# Patient Record
Sex: FEMALE | Race: WHITE | URBAN - METROPOLITAN AREA
[De-identification: names, ages, dates, MRNs, and addresses within clinical notes are randomized per-mention and may not be internally consistent; named-entity substitution may affect disease eponyms.]

---

## 2022-11-18 ENCOUNTER — APPOINTMENT (RX ONLY)
Dept: URBAN - METROPOLITAN AREA CLINIC 51 | Facility: CLINIC | Age: 62
Setting detail: DERMATOLOGY
End: 2022-11-18

## 2022-11-18 ENCOUNTER — APPOINTMENT (RX ONLY)
Dept: URBAN - METROPOLITAN AREA CLINIC 379 | Facility: CLINIC | Age: 62
Setting detail: DERMATOLOGY
End: 2022-11-18

## 2022-11-18 DIAGNOSIS — Z41.9 ENCOUNTER FOR PROCEDURE FOR PURPOSES OTHER THAN REMEDYING HEALTH STATE, UNSPECIFIED: ICD-10-CM

## 2022-11-18 PROCEDURE — ? CONSULTATION - BLEPHAROPLASTY

## 2023-01-24 ENCOUNTER — APPOINTMENT (RX ONLY)
Dept: URBAN - METROPOLITAN AREA CLINIC 51 | Facility: CLINIC | Age: 63
Setting detail: DERMATOLOGY
End: 2023-01-24

## 2023-01-24 DIAGNOSIS — Z41.9 ENCOUNTER FOR PROCEDURE FOR PURPOSES OTHER THAN REMEDYING HEALTH STATE, UNSPECIFIED: ICD-10-CM

## 2023-01-24 PROCEDURE — ? PRESCRIPTION

## 2023-01-24 PROCEDURE — ? PRE-OP WORKLIST

## 2023-01-24 RX ORDER — CEPHALEXIN 500 MG/1
TABLET ORAL
Qty: 10 | Refills: 0 | Status: ERX | COMMUNITY
Start: 2023-01-24

## 2023-01-24 RX ORDER — ONDANSETRON 4 MG/1
TABLET, ORALLY DISINTEGRATING ORAL
Qty: 4 | Refills: 0 | Status: ERX | COMMUNITY
Start: 2023-01-24

## 2023-01-24 RX ORDER — MELOXICAM 7.5 MG/1
TABLET ORAL
Qty: 30 | Refills: 0 | Status: CANCELLED
Stop reason: SDUPTHER

## 2023-01-24 RX ORDER — DIAZEPAM 5 MG/1
TABLET ORAL
Qty: 3 | Refills: 0 | Status: ERX | COMMUNITY
Start: 2023-01-24

## 2023-01-24 RX ORDER — GABAPENTIN 100 MG/1
CAPSULE ORAL
Qty: 60 | Refills: 0 | Status: CANCELLED
Stop reason: SDUPTHER

## 2023-01-24 RX ADMIN — ONDANSETRON: 4 TABLET, ORALLY DISINTEGRATING ORAL at 00:00

## 2023-01-24 RX ADMIN — DIAZEPAM: 5 TABLET ORAL at 00:00

## 2023-01-24 RX ADMIN — CEPHALEXIN: 500 TABLET ORAL at 00:00

## 2023-01-24 NOTE — PROCEDURE: PRE-OP WORKLIST
Date Of Surgery: 1/27/23
Photos Taken?: yes
Date Of Evaluation: 1/24/23
Detail Level: Detailed
Surgeon: Dr Kay
Coordination With:: Peyton

## 2023-01-27 ENCOUNTER — APPOINTMENT (RX ONLY)
Dept: URBAN - METROPOLITAN AREA CLINIC 51 | Facility: CLINIC | Age: 63
Setting detail: DERMATOLOGY
End: 2023-01-27

## 2023-01-27 DIAGNOSIS — Z41.9 ENCOUNTER FOR PROCEDURE FOR PURPOSES OTHER THAN REMEDYING HEALTH STATE, UNSPECIFIED: ICD-10-CM

## 2023-01-27 PROCEDURE — ? COSMETIC BLEPHAROPLASTY

## 2023-01-27 NOTE — PROCEDURE: COSMETIC BLEPHAROPLASTY
Intro: The patient was prepared and draped in the usual sterile fashion.
Skin Closure: simple interrupted
Hemostasis: electrocautery
Preparation: A time-out was taken prior to the procedure to identify the patient and surgical sites. An intravenous line was established and cardiac monitors were placed.
Assistant: magdaleno Castaneda
Consent: The risks, benefits, expectations and alternatives of blepharoplasty were discussed in detail with the patient, including, but not limited to, the risks of postoperative infection, bleeding, delayed healing, injury to the globe,  scarring, pain, asymmetry, loss of vision, lagophthalmos, ectropion, entropion, corneal abrasion, possible need for additional procedures, and dissatisfaction with cosmetic outcome. The patient verbalized understanding and nformed consent was obtained.
Skin Marking: The area of eyelid skin to be excised from the upper eyelid was marked out with a marking pen.
Surgeon: Priscilla Kay
Conjunctival Closure Sutures: 6-0 fast absorbing plain gut
Local Anesthesia Volume In Cc: 0
Skin Marking: Incisions were marked out with a marking pen approximately 1 mm inferior to the lash line from the level of the inferior punctum and extending laterally from the lateral canthus in a slightly downward direction for approximately 1 cm.
Estimated Blood Loss (Cc): minimal
Detail Level: Generalized
Yes

## 2023-02-07 ENCOUNTER — APPOINTMENT (RX ONLY)
Dept: URBAN - METROPOLITAN AREA CLINIC 51 | Facility: CLINIC | Age: 63
Setting detail: DERMATOLOGY
End: 2023-02-07

## 2023-02-07 DIAGNOSIS — Z48.89 ENCOUNTER FOR OTHER SPECIFIED SURGICAL AFTERCARE: ICD-10-CM

## 2023-02-07 PROCEDURE — ? POST OP EVALUATION BLEPHAROPLASTY

## 2023-03-07 ENCOUNTER — APPOINTMENT (RX ONLY)
Dept: URBAN - METROPOLITAN AREA CLINIC 71 | Facility: CLINIC | Age: 63
Setting detail: DERMATOLOGY
End: 2023-03-07

## 2023-03-07 DIAGNOSIS — Z48.89 ENCOUNTER FOR OTHER SPECIFIED SURGICAL AFTERCARE: ICD-10-CM

## 2023-03-07 PROCEDURE — ? POST OP EVALUATION BLEPHAROPLASTY
